# Patient Record
(demographics unavailable — no encounter records)

---

## 2024-10-15 NOTE — HISTORY OF PRESENT ILLNESS
[FreeTextEntry1] : 44 yo in for annual exam  Patient reports uti symptoms x 2 episodes Denies fever/chills, nausea/emesis, dyspnea, or dizziness Reports feeling sudden suprapubic pain and resolved after a couple of days  Denies gross hematuria or flank pain.  Patient denies breast pain/nipple dc/skin dimpling   Reports right breast lateral side palpable mass discovered x 6 months now.  This was followed by a mammogram. Reports mammogram 2/2024 and told everything normal and no abnormalities.  Reports no change in size or shape of mass.  Denies tenderness.

## 2024-10-15 NOTE — PLAN
[FreeTextEntry1] : Pelvic US to evaluate uterus for fibroids  urine culture  thin prep with hr hpv  Screening mammogram order placed; but patient reports Mammogram 2/2024 and unremarkable   She will confirm date of Mammogram  follow up after Pelvic US  ER warnings given

## 2024-10-15 NOTE — PHYSICAL EXAM
[Chaperone Present] : A chaperone was present in the examining room during all aspects of the physical examination [[id number]] : A chaperone was present during the pelvic exam. [FreeTextEntry2] : Elif [Appropriately responsive] : appropriately responsive [Alert] : alert [No Acute Distress] : no acute distress [No Lymphadenopathy] : no lymphadenopathy [Regular Rate Rhythm] : regular rate rhythm [No Murmurs] : no murmurs [Clear to Auscultation B/L] : clear to auscultation bilaterally [Soft] : soft [Non-tender] : non-tender [Non-distended] : non-distended [No HSM] : No HSM [No Lesions] : no lesions [No Mass] : no mass [Oriented x3] : oriented x3 [FreeTextEntry6] : right breast outer quadrant  2x3 firm and mobile mass   non tender [Examination Of The Breasts] : a normal appearance [___cm] : a ~M [unfilled] ~Ucm superior lateral quadrant mass was palpated [Breast Mass Left Breast ___cm] : no mass was palpable [No Masses] : no breast masses were palpable [Labia Majora] : normal [Labia Minora] : normal [Normal] : normal [Uterine Adnexae] : normal [Declined] : Patient declined rectal exam

## 2024-11-01 NOTE — HISTORY OF PRESENT ILLNESS
[FreeTextEntry1] : 42 yo in for follow up after Pelvic US  US secondary to sudden pelvic pain.  Patient reports menses are still fairly regular.  Pain is throughout the month.  Not associated specifically to menses. Denies fever/chills, nausea/emesis, dyspnea, or dizziness.  Pelvic US demonstrates a small anterior fibroid ~ 1.5 cm   Patient points to spot where fibroid is located. She was given option of NSAIDs and Tylenol for pain control vs more aggressive intervention.  Patient opts for expectant management She will follow up Gyn as needed.  ER warnings given.

## 2024-11-01 NOTE — PLAN
[FreeTextEntry1] : Motrin and/or Tylenol prn expectant management  follow up gyn as needed  ER warnings given